# Patient Record
Sex: FEMALE | Race: WHITE | NOT HISPANIC OR LATINO | Employment: OTHER | URBAN - METROPOLITAN AREA
[De-identification: names, ages, dates, MRNs, and addresses within clinical notes are randomized per-mention and may not be internally consistent; named-entity substitution may affect disease eponyms.]

---

## 2023-03-26 DIAGNOSIS — F41.9 ANXIETY DISORDER, UNSPECIFIED: ICD-10-CM

## 2023-03-27 RX ORDER — ESCITALOPRAM OXALATE 10 MG/1
TABLET ORAL
Qty: 30 TABLET | Refills: 2 | Status: SHIPPED | OUTPATIENT
Start: 2023-03-27 | End: 2023-08-28

## 2023-08-28 DIAGNOSIS — F41.9 ANXIETY DISORDER, UNSPECIFIED: ICD-10-CM

## 2023-08-28 RX ORDER — ESCITALOPRAM OXALATE 10 MG/1
TABLET ORAL
Qty: 30 TABLET | Refills: 2 | Status: SHIPPED | OUTPATIENT
Start: 2023-08-28 | End: 2023-12-07

## 2023-12-07 DIAGNOSIS — F41.9 ANXIETY DISORDER, UNSPECIFIED: ICD-10-CM

## 2023-12-07 RX ORDER — ESCITALOPRAM OXALATE 10 MG/1
TABLET ORAL
Qty: 30 TABLET | Refills: 2 | Status: SHIPPED | OUTPATIENT
Start: 2023-12-07 | End: 2024-04-01

## 2024-01-17 ENCOUNTER — APPOINTMENT (OUTPATIENT)
Dept: PRIMARY CARE | Facility: CLINIC | Age: 44
End: 2024-01-17
Payer: COMMERCIAL

## 2024-04-01 DIAGNOSIS — F41.9 ANXIETY DISORDER, UNSPECIFIED: ICD-10-CM

## 2024-04-01 RX ORDER — ESCITALOPRAM OXALATE 10 MG/1
TABLET ORAL
Qty: 30 TABLET | Refills: 2 | Status: SHIPPED | OUTPATIENT
Start: 2024-04-01

## 2024-05-08 ENCOUNTER — OFFICE VISIT (OUTPATIENT)
Dept: PRIMARY CARE | Facility: CLINIC | Age: 44
End: 2024-05-08
Payer: COMMERCIAL

## 2024-05-08 ENCOUNTER — LAB (OUTPATIENT)
Dept: LAB | Facility: LAB | Age: 44
End: 2024-05-08
Payer: COMMERCIAL

## 2024-05-08 VITALS
WEIGHT: 161.8 LBS | DIASTOLIC BLOOD PRESSURE: 80 MMHG | BODY MASS INDEX: 24.52 KG/M2 | HEART RATE: 88 BPM | TEMPERATURE: 98.1 F | HEIGHT: 68 IN | SYSTOLIC BLOOD PRESSURE: 130 MMHG | OXYGEN SATURATION: 100 %

## 2024-05-08 DIAGNOSIS — R10.13 DYSPEPSIA: ICD-10-CM

## 2024-05-08 DIAGNOSIS — G56.03 BILATERAL CARPAL TUNNEL SYNDROME: Primary | ICD-10-CM

## 2024-05-08 DIAGNOSIS — Z91.018 ALLERGY TO MEAT: ICD-10-CM

## 2024-05-08 DIAGNOSIS — Z00.00 WELL ADULT EXAM: ICD-10-CM

## 2024-05-08 LAB
ALBUMIN SERPL BCP-MCNC: 4.7 G/DL (ref 3.4–5)
ALP SERPL-CCNC: 52 U/L (ref 33–110)
ALT SERPL W P-5'-P-CCNC: 15 U/L (ref 7–45)
ANION GAP SERPL CALC-SCNC: 11 MMOL/L (ref 10–20)
AST SERPL W P-5'-P-CCNC: 13 U/L (ref 9–39)
BILIRUB SERPL-MCNC: 0.7 MG/DL (ref 0–1.2)
BUN SERPL-MCNC: 9 MG/DL (ref 6–23)
CALCIUM SERPL-MCNC: 9.5 MG/DL (ref 8.6–10.3)
CHLORIDE SERPL-SCNC: 106 MMOL/L (ref 98–107)
CHOLEST SERPL-MCNC: 157 MG/DL (ref 0–199)
CHOLESTEROL/HDL RATIO: 2.1
CO2 SERPL-SCNC: 25 MMOL/L (ref 21–32)
CREAT SERPL-MCNC: 0.74 MG/DL (ref 0.5–1.05)
EGFRCR SERPLBLD CKD-EPI 2021: >90 ML/MIN/1.73M*2
ERYTHROCYTE [DISTWIDTH] IN BLOOD BY AUTOMATED COUNT: 12.5 % (ref 11.5–14.5)
GLUCOSE SERPL-MCNC: 82 MG/DL (ref 74–99)
HCT VFR BLD AUTO: 41.1 % (ref 36–46)
HDLC SERPL-MCNC: 75.4 MG/DL
HGB BLD-MCNC: 13.9 G/DL (ref 12–16)
LDLC SERPL CALC-MCNC: 74 MG/DL
MCH RBC QN AUTO: 31 PG (ref 26–34)
MCHC RBC AUTO-ENTMCNC: 33.8 G/DL (ref 32–36)
MCV RBC AUTO: 92 FL (ref 80–100)
NON HDL CHOLESTEROL: 82 MG/DL (ref 0–149)
NRBC BLD-RTO: 0 /100 WBCS (ref 0–0)
PLATELET # BLD AUTO: 269 X10*3/UL (ref 150–450)
POTASSIUM SERPL-SCNC: 4.1 MMOL/L (ref 3.5–5.3)
PROT SERPL-MCNC: 6.8 G/DL (ref 6.4–8.2)
RBC # BLD AUTO: 4.48 X10*6/UL (ref 4–5.2)
SODIUM SERPL-SCNC: 138 MMOL/L (ref 136–145)
TRIGL SERPL-MCNC: 38 MG/DL (ref 0–149)
TSH SERPL-ACNC: 1.77 MIU/L (ref 0.44–3.98)
VLDL: 8 MG/DL (ref 0–40)
WBC # BLD AUTO: 4.7 X10*3/UL (ref 4.4–11.3)

## 2024-05-08 PROCEDURE — 80061 LIPID PANEL: CPT

## 2024-05-08 PROCEDURE — 99214 OFFICE O/P EST MOD 30 MIN: CPT | Performed by: INTERNAL MEDICINE

## 2024-05-08 PROCEDURE — 36415 COLL VENOUS BLD VENIPUNCTURE: CPT

## 2024-05-08 PROCEDURE — 83036 HEMOGLOBIN GLYCOSYLATED A1C: CPT

## 2024-05-08 PROCEDURE — 84443 ASSAY THYROID STIM HORMONE: CPT

## 2024-05-08 PROCEDURE — 86008 ALLG SPEC IGE RECOMB EA: CPT

## 2024-05-08 PROCEDURE — 86003 ALLG SPEC IGE CRUDE XTRC EA: CPT

## 2024-05-08 PROCEDURE — 80053 COMPREHEN METABOLIC PANEL: CPT

## 2024-05-08 PROCEDURE — 85027 COMPLETE CBC AUTOMATED: CPT

## 2024-05-08 ASSESSMENT — PATIENT HEALTH QUESTIONNAIRE - PHQ9
1. LITTLE INTEREST OR PLEASURE IN DOING THINGS: NOT AT ALL
SUM OF ALL RESPONSES TO PHQ9 QUESTIONS 1 AND 2: 0
2. FEELING DOWN, DEPRESSED OR HOPELESS: NOT AT ALL

## 2024-05-08 ASSESSMENT — ENCOUNTER SYMPTOMS
OCCASIONAL FEELINGS OF UNSTEADINESS: 0
DEPRESSION: 0

## 2024-05-08 NOTE — PROGRESS NOTES
"Subjective   Patient ID: Tena Robles is a 44 y.o. female who presents for GI Problem.    HPI     #1 CTS-bilateral.  Progressing.  Some weakness.  #2 ~5 mths PTV w/ onset of difficulty eating animal products.  States that when she eats red meat specifically approximately 1 hour later developed severe crampy abdominal pain increased bowel movements.  Gradually resolves.  This happens quite reliably at this point with eating meat.  If she does not eat red meat, does not have any GI symptoms.  Able to eat chicken and other food without difficulty.  No history of similar.    Review of Systems    Objective   Pulse 88   Temp 36.7 °C (98.1 °F) (Temporal)   Ht 1.715 m (5' 7.52\")   Wt 73.4 kg (161 lb 12.8 oz)   SpO2 100%   BMI 24.95 kg/m²     Physical Exam    Assessment/Plan     #1 CTS-bl.  Reviewed.  Consult hand orthopedist.  #2  Apparent difficulty eating meat-reviewed.  Question tick exposure.  Avoid red meat.  Consult GI, consult allergy immunology.  Check labs.     "

## 2024-05-09 LAB
EST. AVERAGE GLUCOSE BLD GHB EST-MCNC: 91 MG/DL
HBA1C MFR BLD: 4.8 %

## 2024-05-13 LAB
ALPHA GAL IGE, VIRC: <0.1 KU/L
BEEF IGE, VIRC: <0.1 KU/L
CLASS BEEF, VIRC: 0
CLASS LAMB, VIRC: 0
CLASS PORK, VIRC: 0
LAMB IGE, VIRC: <0.1 KU/L
PORK IGE, VIRC: <0.1 KU/L

## 2024-05-22 ENCOUNTER — OFFICE VISIT (OUTPATIENT)
Dept: ORTHOPEDIC SURGERY | Facility: CLINIC | Age: 44
End: 2024-05-22
Payer: COMMERCIAL

## 2024-05-22 VITALS — BODY MASS INDEX: 23.95 KG/M2 | HEIGHT: 68 IN | WEIGHT: 158 LBS

## 2024-05-22 DIAGNOSIS — G56.03 BILATERAL CARPAL TUNNEL SYNDROME: ICD-10-CM

## 2024-05-22 PROCEDURE — 99214 OFFICE O/P EST MOD 30 MIN: CPT | Performed by: ORTHOPAEDIC SURGERY

## 2024-05-22 PROCEDURE — 1036F TOBACCO NON-USER: CPT | Performed by: ORTHOPAEDIC SURGERY

## 2024-05-22 ASSESSMENT — PAIN SCALES - GENERAL: PAINLEVEL_OUTOF10: 2

## 2024-05-22 ASSESSMENT — PAIN - FUNCTIONAL ASSESSMENT: PAIN_FUNCTIONAL_ASSESSMENT: 0-10

## 2024-05-22 NOTE — PROGRESS NOTES
Follow up bilateral carpal tunnel.  She was last seen 09/22/2021.  No testing.  She states her right hand is worse.  She wants to discuss surgery.

## 2024-05-22 NOTE — PROGRESS NOTES
44 y.o. female presents today for follow-up of bilateral hand numbness, tingling, weakness and pain. The patient reports symptoms for months, getting worse.  Pain is controlled.  Reports no previous surgeries, injections or trauma to the area.  Reports pain worse with use, better at rest.  Pain numb and tingly, wakes them from sleep.   Worse driving a car and talking on a phone.   Worse pulling hair and folding laundry.  Right worse than left.  Has been wearing braces at nighttime which helps some.  Symptoms getting worse and would like to have something further done.    Review of Systems    Constitutional: see HPI, no fever, no chills, not feeling tired, no significant weight gain or weight loss.   Eyes: No vision changes  ENT: no nosebleeds.   Cardiovascular: no chest pain.   Respiratory: no shortness of breath and no cough.   Gastrointestinal: no abdominal pain, no nausea, no vomiting and no diarrhea.   Musculoskeletal: per HPI  Neurological: no headache, no gait disturbances  Psychiatric: no depression and no sleep disturbances.   Endocrine: no muscle weakness and no muscle cramps.   Hematologic/Lymphatic: no swollen glands and no tendency for easy bruising or excessive swelling.     Patient's past medical history, past surgical history, allergies, and medications have been reviewed unless otherwise noted in the chart.     Carpal Tunnel Exam  Inspection:  no evidence of infection, no edema, no erythema, no ecchymosis, Palpation:  compartments are soft, no pain with palpation, Range of Motion:  full wrist and finger range of motion, Stability:  no wrist instability detected, Strength:  5/5 APB and intrinsics, Skin:  intact, Vascular:  capillary refill <2 seconds distally, Sensation:  decreased in the median nerve distribution, Test:  positive Tinel's at the Carpal Tunnel, positive Direct Carpal Tunnel Compression Test      Constitutional   General appearance: Alert and in no acute distress. Well developed, well  nourished.    Eyes   External Eye, Conjunctiva and lids: Normal external exam - pupils were equal in size, round, reactive to light (PERRL) with normal accommodation and extraocular movements intact (EOMI).   Ears, Nose, Mouth, and Throat   Hearing: Normal.   Neck   Neck: No neck mass was observed. Supple.   Pulmonary   Respiratory effort: No respiratory distress.   Cardiovascular   Examination of extremities: No peripheral edema.   Psychiatric   Judgment and insight: Intact.   Orientation to person, place, and time: Alert and oriented x 3.       Mood and affect: Normal.      Bilateral carpal tunnel syndrome  Based on the history, physical exam and imaging studies above, the patient's presentation is consistent with the above diagnosis.  I had a long discussion with the patient regarding their presentation and the treatment options.  We discussed initial nonoperative versus operative management options as well as potential further diagnostic imaging.  We again discussed her treatment options going forward along with their associated risks and benefits. After thorough discussion, the patient has elected to proceed with conservative management. All questions were answered to the patients satisfaction who seems satisfied with the plan.  They will call the office with any questions/concerns.    Night splints  EMG  Follow-up after EMG

## 2024-05-29 ENCOUNTER — APPOINTMENT (OUTPATIENT)
Dept: NEUROLOGY | Facility: CLINIC | Age: 44
End: 2024-05-29
Payer: COMMERCIAL

## 2024-06-28 ENCOUNTER — APPOINTMENT (OUTPATIENT)
Dept: NEUROLOGY | Facility: CLINIC | Age: 44
End: 2024-06-28
Payer: COMMERCIAL

## 2024-07-03 ENCOUNTER — APPOINTMENT (OUTPATIENT)
Dept: NEUROLOGY | Facility: CLINIC | Age: 44
End: 2024-07-03
Payer: COMMERCIAL

## 2024-07-03 VITALS
HEART RATE: 72 BPM | HEIGHT: 67 IN | DIASTOLIC BLOOD PRESSURE: 74 MMHG | TEMPERATURE: 97.1 F | BODY MASS INDEX: 25.74 KG/M2 | WEIGHT: 164 LBS | SYSTOLIC BLOOD PRESSURE: 127 MMHG

## 2024-07-03 DIAGNOSIS — G56.03 CARPAL TUNNEL SYNDROME, BILATERAL: Primary | ICD-10-CM

## 2024-07-03 PROCEDURE — 95886 MUSC TEST DONE W/N TEST COMP: CPT | Performed by: PSYCHIATRY & NEUROLOGY

## 2024-07-03 PROCEDURE — 95911 NRV CNDJ TEST 9-10 STUDIES: CPT | Performed by: PSYCHIATRY & NEUROLOGY

## 2024-07-03 NOTE — LETTER
July 3, 2024     Osmani Burks DO  [address]  [facility] [address]    Patient: Tena Robles   YOB: 1980   Date of Visit: 7/3/2024       Dear Dr. Osmani Burks DO:    Thank you for referring Tena Robles to me for EMG/NCS. Below are my notes for this visit.  If you have questions, please do not hesitate to call me.       Sincerely,     Vladimir Fuentes Jr., M.D., [facility]       CC: Kelly Boyer MD  ______________________________________________________________________________________    PRELIMINARY EMG REPORT    This study is mildly abnormal.  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 carpal tunnel syndrome), without active denervation of the thenar muscles.  There is no electrophysiological evidence for another large-fiber peripheral neuropathy, cervical radiculopathy, or brachial plexopathy in either arm.    Vladimir Fuentes Jr., M.D., [facility]

## 2024-07-03 NOTE — PROGRESS NOTES
PRELIMINARY EMG REPORT    This study is mildly abnormal.  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 carpal tunnel syndrome), without active denervation of the thenar muscles.  There is no electrophysiological evidence for another large-fiber peripheral neuropathy, cervical radiculopathy, or brachial plexopathy in either arm.    Vladimir Fuentes Jr., M.D., FAAN, FAANEM

## 2024-07-17 ENCOUNTER — APPOINTMENT (OUTPATIENT)
Dept: ORTHOPEDIC SURGERY | Facility: CLINIC | Age: 44
End: 2024-07-17
Payer: COMMERCIAL

## 2024-08-07 ENCOUNTER — HOSPITAL ENCOUNTER (OUTPATIENT)
Facility: HOSPITAL | Age: 44
Setting detail: OUTPATIENT SURGERY
End: 2024-08-07
Attending: ORTHOPAEDIC SURGERY | Admitting: ORTHOPAEDIC SURGERY
Payer: COMMERCIAL

## 2024-08-07 ENCOUNTER — PREP FOR PROCEDURE (OUTPATIENT)
Dept: ORTHOPEDIC SURGERY | Facility: CLINIC | Age: 44
End: 2024-08-07

## 2024-08-07 ENCOUNTER — APPOINTMENT (OUTPATIENT)
Dept: ORTHOPEDIC SURGERY | Facility: CLINIC | Age: 44
End: 2024-08-07
Payer: COMMERCIAL

## 2024-08-07 VITALS — WEIGHT: 164 LBS | HEIGHT: 67 IN | BODY MASS INDEX: 25.74 KG/M2

## 2024-08-07 DIAGNOSIS — G56.01 CARPAL TUNNEL SYNDROME ON RIGHT: ICD-10-CM

## 2024-08-07 DIAGNOSIS — G56.03 BILATERAL CARPAL TUNNEL SYNDROME: Primary | ICD-10-CM

## 2024-08-07 PROCEDURE — 3008F BODY MASS INDEX DOCD: CPT | Performed by: ORTHOPAEDIC SURGERY

## 2024-08-07 PROCEDURE — 1036F TOBACCO NON-USER: CPT | Performed by: ORTHOPAEDIC SURGERY

## 2024-08-07 PROCEDURE — 99214 OFFICE O/P EST MOD 30 MIN: CPT | Performed by: ORTHOPAEDIC SURGERY

## 2024-08-07 NOTE — PROGRESS NOTES
44 y.o. female presents today for follow-up of bilateral hand numbness, tingling, weakness and pain. The patient reports symptoms for months, getting worse.  Pain is controlled.  Reports no previous surgeries, injections or trauma to the area.  Reports pain worse with use, better at rest.  Pain numb and tingly, wakes them from sleep.   Worse driving a car and talking on a phone.   Worse pulling hair and folding laundry.  Right worse than left.  Has been wearing braces at nighttime which helps some, did get some new braces which were helping a little bit more but continues to have symptoms.  Symptoms getting worse and would like to have something further done.  Got EMG.      Review of Systems    Constitutional: see HPI, no fever, no chills, not feeling tired, no significant weight gain or weight loss.   Eyes: No vision changes  ENT: no nosebleeds.   Cardiovascular: no chest pain.   Respiratory: no shortness of breath and no cough.   Gastrointestinal: no abdominal pain, no nausea, no vomiting and no diarrhea.   Musculoskeletal: per HPI  Neurological: no headache, no gait disturbances  Psychiatric: no depression and no sleep disturbances.   Endocrine: no muscle weakness and no muscle cramps.   Hematologic/Lymphatic: no swollen glands and no tendency for easy bruising or excessive swelling.     Patient's past medical history, past surgical history, allergies, and medications have been reviewed unless otherwise noted in the chart.     Carpal Tunnel Exam  Inspection:  no evidence of infection, no edema, no erythema, no ecchymosis, Palpation:  compartments are soft, no pain with palpation, Range of Motion:  full wrist and finger range of motion, Stability:  no wrist instability detected, Strength:  5/5 APB and intrinsics, Skin:  intact, Vascular:  capillary refill <2 seconds distally, Sensation:  decreased in the median nerve distribution, Test:  positive Tinel's at the Carpal Tunnel, positive Direct Carpal Tunnel  Compression Test      Constitutional   General appearance: Alert and in no acute distress. Well developed, well nourished.    Eyes   External Eye, Conjunctiva and lids: Normal external exam - pupils were equal in size, round, reactive to light (PERRL) with normal accommodation and extraocular movements intact (EOMI).   Ears, Nose, Mouth, and Throat   Hearing: Normal.   Neck   Neck: No neck mass was observed. Supple.   Pulmonary   Respiratory effort: No respiratory distress.   Cardiovascular   Examination of extremities: No peripheral edema.   Psychiatric   Judgment and insight: Intact.   Orientation to person, place, and time: Alert and oriented x 3.       Mood and affect: Normal.      Mg shows very mild bilateral carpal tunnel syndrome    Bilateral carpal tunnel syndrome  Surgery discussion: I discussed the diagnosis and treatment options with the patient today along with their associated risks and benefits. After thorough discussion, the patient has elected to proceed with surgical intervention. The patient understands the risks of,including, but not limited to, bleeding, infection, loss of life or limb, pain, permanent numbness, tingling, weakness, loss of motion, failure of the goals of surgery or the potential need for additional surgery. The patient would like to accept these risks and proceed. All questions were answered to the patients satisfaction and the patient seems satisfied with the plan.    Plan right endoscopic carpal tunnel release  Follow-up 2 weeks after no x-ray

## 2024-08-08 ENCOUNTER — TELEPHONE (OUTPATIENT)
Dept: PREADMISSION TESTING | Facility: HOSPITAL | Age: 44
End: 2024-08-08
Payer: COMMERCIAL

## 2024-08-13 ENCOUNTER — TELEPHONE (OUTPATIENT)
Dept: PREADMISSION TESTING | Facility: HOSPITAL | Age: 44
End: 2024-08-13

## 2024-08-26 ENCOUNTER — TELEPHONE (OUTPATIENT)
Dept: ORTHOPEDIC SURGERY | Facility: HOSPITAL | Age: 44
End: 2024-08-26
Payer: COMMERCIAL

## 2024-08-26 NOTE — TELEPHONE ENCOUNTER
Patient called in stating that she is scheduled for surgery on 9/3/24 but needs to reschedule. She is a  and her  travels so they need to schedule accordingly but she is thinking about pushing it out until February if she can. I advised instead of cancelling that we may potentially be able to move it.     Can you call her back to discuss a new surgery date.

## 2024-08-26 NOTE — TELEPHONE ENCOUNTER
Unable to R/S SX that far out , She'll need another Pre op appointment with Dr. Burks about a 3 weeks prior to surgery - Left her a message -

## 2024-08-29 ENCOUNTER — OFFICE VISIT (OUTPATIENT)
Dept: PRIMARY CARE | Facility: CLINIC | Age: 44
End: 2024-08-29
Payer: COMMERCIAL

## 2024-08-29 VITALS
SYSTOLIC BLOOD PRESSURE: 120 MMHG | DIASTOLIC BLOOD PRESSURE: 80 MMHG | TEMPERATURE: 97.8 F | BODY MASS INDEX: 25.84 KG/M2 | WEIGHT: 165 LBS

## 2024-08-29 DIAGNOSIS — J32.0 CHRONIC MAXILLARY SINUSITIS: Primary | ICD-10-CM

## 2024-08-29 PROBLEM — F41.9 ANXIETY: Status: ACTIVE | Noted: 2024-08-29

## 2024-08-29 PROBLEM — M25.519 SHOULDER PAIN: Status: ACTIVE | Noted: 2024-08-29

## 2024-08-29 PROBLEM — Z98.1 H/O SPINAL FUSION: Status: ACTIVE | Noted: 2024-08-29

## 2024-08-29 PROBLEM — I10 HYPERTENSION: Status: ACTIVE | Noted: 2024-08-29

## 2024-08-29 PROBLEM — M75.40 IMPINGEMENT SYNDROME OF SHOULDER REGION: Status: ACTIVE | Noted: 2024-08-29

## 2024-08-29 PROBLEM — M75.20 BICEPS TENDINITIS: Status: ACTIVE | Noted: 2024-08-29

## 2024-08-29 PROBLEM — Z98.1 HISTORY OF SPINAL FUSION: Status: ACTIVE | Noted: 2024-08-29

## 2024-08-29 PROBLEM — M75.42 IMPINGEMENT SYNDROME OF LEFT SHOULDER: Status: ACTIVE | Noted: 2024-08-29

## 2024-08-29 PROBLEM — M25.559 ARTHRALGIA OF HIP: Status: ACTIVE | Noted: 2024-08-29

## 2024-08-29 PROBLEM — H66.90 RECURRENT ACUTE OTITIS MEDIA: Status: ACTIVE | Noted: 2024-08-29

## 2024-08-29 PROBLEM — M67.40 GANGLION CYST: Status: ACTIVE | Noted: 2024-08-29

## 2024-08-29 PROBLEM — M25.551 LATERAL PAIN OF RIGHT HIP: Status: ACTIVE | Noted: 2024-08-29

## 2024-08-29 PROBLEM — G56.00 CARPAL TUNNEL SYNDROME: Status: ACTIVE | Noted: 2024-08-29

## 2024-08-29 PROBLEM — R11.2 NAUSEA AND VOMITING: Status: ACTIVE | Noted: 2024-08-29

## 2024-08-29 PROBLEM — M41.9 SCOLIOSIS: Status: ACTIVE | Noted: 2024-08-29

## 2024-08-29 PROCEDURE — 99214 OFFICE O/P EST MOD 30 MIN: CPT | Performed by: NURSE PRACTITIONER

## 2024-08-29 PROCEDURE — 3079F DIAST BP 80-89 MM HG: CPT | Performed by: NURSE PRACTITIONER

## 2024-08-29 PROCEDURE — 3074F SYST BP LT 130 MM HG: CPT | Performed by: NURSE PRACTITIONER

## 2024-08-29 RX ORDER — DOXYCYCLINE 100 MG/1
100 CAPSULE ORAL 2 TIMES DAILY
Qty: 20 CAPSULE | Refills: 0 | Status: SHIPPED | OUTPATIENT
Start: 2024-08-29 | End: 2024-09-08

## 2024-08-29 RX ORDER — MOXIFLOXACIN HYDROCHLORIDE 400 MG/1
1 TABLET ORAL
COMMUNITY
Start: 2024-08-16 | End: 2024-08-29 | Stop reason: WASHOUT

## 2024-08-29 RX ORDER — AZELASTINE 1 MG/ML
2 SPRAY, METERED NASAL 2 TIMES DAILY
Qty: 30 ML | Refills: 1 | Status: SHIPPED | OUTPATIENT
Start: 2024-08-29 | End: 2025-08-29

## 2024-08-29 RX ORDER — PREDNISONE 10 MG/1
TABLET ORAL
Qty: 10 TABLET | Refills: 0 | Status: SHIPPED | OUTPATIENT
Start: 2024-08-29

## 2024-08-29 NOTE — PROGRESS NOTES
Subjective   Patient ID: Tena Robles is a 44 y.o. female who presents for Sinusitis (Possible infection, congestion, sinus pain and pressure, stuffy, teeth hurt, sob, ear pain).    HPI '  First of August household was sick   Son was sick and dtgr   Dtgr was on abx and she got better.  Jocelyne Pot and Mucinex and Amoxicillin x 7 days   Then Moxifloxacin x 7 days  Colored mucus went away  Flonase for a few days    Later in the afternoon   Teeth hurt and ear pain and pressire  Eyes are sore  Low grade sinus H/A    Now chest is achy  Lots of PND  Itchy eyes and ears     Review of Systems   Constitutional:  Positive for activity change.   HENT:  Positive for congestion, postnasal drip, sinus pressure, sinus pain and sore throat.    Respiratory:  Positive for cough and chest tightness.    All other systems reviewed and are negative.      Objective   /80   Temp 36.6 °C (97.8 °F)   Wt 74.8 kg (165 lb)   BMI 25.84 kg/m²     Physical Exam  Vitals and nursing note reviewed.   Constitutional:       Appearance: Normal appearance.   HENT:      Head: Normocephalic and atraumatic.      Right Ear: Ear canal and external ear normal.      Left Ear: Ear canal and external ear normal.      Ears:      Comments: Fluid/air line YVROSE     Nose: Congestion present.      Mouth/Throat:      Comments: + PND  Eyes:      Pupils: Pupils are equal, round, and reactive to light.   Cardiovascular:      Rate and Rhythm: Normal rate and regular rhythm.      Pulses: Normal pulses.      Heart sounds: Normal heart sounds.   Pulmonary:      Effort: Pulmonary effort is normal.      Comments: Coarse breath sounds YVROSE + cough  Neurological:      Mental Status: She is alert and oriented to person, place, and time.   Psychiatric:         Mood and Affect: Mood normal.         Behavior: Behavior normal.         Thought Content: Thought content normal.         Judgment: Judgment normal.         Assessment/Plan   Assessment & Plan  Chronic maxillary  sinusitis    Orders:    doxycycline (Vibramycin) 100 mg capsule; Take 1 capsule (100 mg) by mouth 2 times a day for 10 days. Take with at least 8 ounces (large glass) of water, do not lie down for 30 minutes after    predniSONE (Deltasone) 10 mg tablet; One  pill twice daily TAKE WITH FOOD    azelastine (Astelin) 137 mcg (0.1 %) nasal spray; Administer 2 sprays into each nostril 2 times a day. Use in each nostril as directed

## 2024-09-16 ENCOUNTER — APPOINTMENT (OUTPATIENT)
Dept: ORTHOPEDIC SURGERY | Facility: HOSPITAL | Age: 44
End: 2024-09-16
Payer: COMMERCIAL

## 2024-09-22 ASSESSMENT — ENCOUNTER SYMPTOMS
SINUS PRESSURE: 1
SORE THROAT: 1
ACTIVITY CHANGE: 1
SINUS PAIN: 1
COUGH: 1
CHEST TIGHTNESS: 1

## 2024-10-28 ENCOUNTER — APPOINTMENT (OUTPATIENT)
Dept: GASTROENTEROLOGY | Facility: CLINIC | Age: 44
End: 2024-10-28
Payer: COMMERCIAL